# Patient Record
Sex: FEMALE | Race: WHITE
[De-identification: names, ages, dates, MRNs, and addresses within clinical notes are randomized per-mention and may not be internally consistent; named-entity substitution may affect disease eponyms.]

---

## 2022-05-25 ENCOUNTER — HOSPITAL ENCOUNTER (OUTPATIENT)
Dept: HOSPITAL 75 - CARD | Age: 67
End: 2022-05-25
Attending: INTERNAL MEDICINE
Payer: COMMERCIAL

## 2022-05-25 VITALS — WEIGHT: 178.57 LBS | BODY MASS INDEX: 29.04 KG/M2 | HEIGHT: 65.75 IN

## 2022-05-25 VITALS — SYSTOLIC BLOOD PRESSURE: 172 MMHG | DIASTOLIC BLOOD PRESSURE: 81 MMHG

## 2022-05-25 DIAGNOSIS — I08.0: Primary | ICD-10-CM

## 2022-05-25 DIAGNOSIS — I11.9: ICD-10-CM

## 2022-05-25 DIAGNOSIS — I25.10: ICD-10-CM

## 2022-05-25 PROCEDURE — 93306 TTE W/DOPPLER COMPLETE: CPT

## 2022-05-25 PROCEDURE — 78452 HT MUSCLE IMAGE SPECT MULT: CPT

## 2022-05-25 PROCEDURE — 93017 CV STRESS TEST TRACING ONLY: CPT

## 2022-05-25 NOTE — CARDIOLOGY STRESS TEST REPORT
Stress Test Report


Date of Procedure/Referring:


Date of Procedure:  May 25, 2022


PCP


Francie Gonzales DO


Admitting Physician


Admitting Physician:


 








Attending Physician:


Yareli Farley MD





Indications:


HTN





Baseline Heart Rate:


63





Baseline Blood Pressure:


Blood Pressure Systolic:  172


Blood Pressure Diastolic:  81





Vital Signs








  Date Time  Temp Pulse Resp B/P (MAP) Pulse Ox O2 Delivery O2 Flow Rate FiO2


 


5/25/22 13:12  63 16 172/81 (111) 98 Room Air  








Baseline Vital Signs





Vital Signs








  Date Time  Temp Pulse Resp B/P (MAP) Pulse Ox O2 Delivery O2 Flow Rate FiO2


 


5/25/22 13:12  63 16 172/81 (111) 98 Room Air  











Baseline EKG:


Baseline EKG:  NSR





Summary:


After explaining the procedure and details to the patient, she  signed the 

consent and was brought to the stress nuclear laboratory.





Patient exercised on standard Isaiah protocol, EKG, heart rate and blood pressure

were monitored continuously, resting and stress doses of radio tracer were 

injected, imaging was acquired and reviewed in the short axis, horizontal long 

axis and vertical long axis views





Patient was able to exercise for a total of 5 minutes on Isaiah protocol,  METs 

6.8


Maximum heart rate 147      


Maximum blood pressure 218/108       


Stress EKG, Minimal nondiagnostic changes


Recovery EKG, Return to baseline


TID:  1.18


SSS:  6


SDS:  3


EF:  55





Conclusion:


1.  Fair exercise tolerance for a total of 5 minutes on standard Isaiah protocol,

7 METS achieving 94% of maximum expected heart rate


2.  Appropriate heart rate response to exercise with severe hypertensive 

response to exercise with peak blood pressure 218/108 return to baseline during 

recovery


3.  Nondiagnostic EKG changes with exercise return to baseline during recovery


4.  Processing artifacts, there is no significant ischemia or infarction on 

SPECT images


5.  Normal left ventricular size, ejection fraction 55%





Copy


Copies To 1:   FRANCIE GONZALES BASHAR J MD              May 25, 2022 16:29

## 2022-11-07 ENCOUNTER — HOSPITAL ENCOUNTER (OUTPATIENT)
Dept: HOSPITAL 75 - CARD | Age: 67
End: 2022-11-07
Attending: INTERNAL MEDICINE
Payer: MEDICARE

## 2022-11-07 DIAGNOSIS — I11.9: ICD-10-CM

## 2022-11-07 DIAGNOSIS — I08.0: Primary | ICD-10-CM

## 2022-11-07 DIAGNOSIS — I25.10: ICD-10-CM

## 2022-11-07 PROCEDURE — 93306 TTE W/DOPPLER COMPLETE: CPT
